# Patient Record
Sex: FEMALE | Race: WHITE | NOT HISPANIC OR LATINO
[De-identification: names, ages, dates, MRNs, and addresses within clinical notes are randomized per-mention and may not be internally consistent; named-entity substitution may affect disease eponyms.]

---

## 2017-01-02 ENCOUNTER — FORM ENCOUNTER (OUTPATIENT)
Age: 52
End: 2017-01-02

## 2017-01-03 ENCOUNTER — OUTPATIENT (OUTPATIENT)
Dept: OUTPATIENT SERVICES | Facility: HOSPITAL | Age: 52
LOS: 1 days | End: 2017-01-03
Payer: COMMERCIAL

## 2017-01-03 DIAGNOSIS — Z01.818 ENCOUNTER FOR OTHER PREPROCEDURAL EXAMINATION: ICD-10-CM

## 2017-01-03 DIAGNOSIS — C43.62 MALIGNANT MELANOMA OF LEFT UPPER LIMB, INCLUDING SHOULDER: ICD-10-CM

## 2017-01-03 LAB
ANION GAP SERPL CALC-SCNC: 18 MMOL/L — HIGH (ref 5–17)
APTT BLD: 31.8 SEC — SIGNIFICANT CHANGE UP (ref 27.5–37.4)
BUN SERPL-MCNC: 14 MG/DL — SIGNIFICANT CHANGE UP (ref 7–23)
CALCIUM SERPL-MCNC: 9.8 MG/DL — SIGNIFICANT CHANGE UP (ref 8.4–10.5)
CHLORIDE SERPL-SCNC: 101 MMOL/L — SIGNIFICANT CHANGE UP (ref 96–108)
CO2 SERPL-SCNC: 25 MMOL/L — SIGNIFICANT CHANGE UP (ref 22–31)
CREAT SERPL-MCNC: 0.74 MG/DL — SIGNIFICANT CHANGE UP (ref 0.5–1.3)
GLUCOSE SERPL-MCNC: 89 MG/DL — SIGNIFICANT CHANGE UP (ref 70–99)
HCT VFR BLD CALC: 38.9 % — SIGNIFICANT CHANGE UP (ref 34.5–45)
HGB BLD-MCNC: 12.8 G/DL — SIGNIFICANT CHANGE UP (ref 11.5–15.5)
INR BLD: 2.27 RATIO — HIGH (ref 0.88–1.16)
LDH SERPL L TO P-CCNC: 221 U/L — SIGNIFICANT CHANGE UP (ref 50–242)
MCHC RBC-ENTMCNC: 32.4 PG — SIGNIFICANT CHANGE UP (ref 27–34)
MCHC RBC-ENTMCNC: 32.9 GM/DL — SIGNIFICANT CHANGE UP (ref 32–36)
MCV RBC AUTO: 98.5 FL — SIGNIFICANT CHANGE UP (ref 80–100)
PLATELET # BLD AUTO: 220 K/UL — SIGNIFICANT CHANGE UP (ref 150–400)
POTASSIUM SERPL-MCNC: 4.1 MMOL/L — SIGNIFICANT CHANGE UP (ref 3.5–5.3)
POTASSIUM SERPL-SCNC: 4.1 MMOL/L — SIGNIFICANT CHANGE UP (ref 3.5–5.3)
PROTHROM AB SERPL-ACNC: 25.9 SEC — HIGH (ref 10–13.1)
RBC # BLD: 3.95 M/UL — SIGNIFICANT CHANGE UP (ref 3.8–5.2)
RBC # FLD: 12.3 % — SIGNIFICANT CHANGE UP (ref 10.3–14.5)
SODIUM SERPL-SCNC: 144 MMOL/L — SIGNIFICANT CHANGE UP (ref 135–145)
WBC # BLD: 4.58 K/UL — SIGNIFICANT CHANGE UP (ref 3.8–10.5)
WBC # FLD AUTO: 4.58 K/UL — SIGNIFICANT CHANGE UP (ref 3.8–10.5)

## 2017-01-03 PROCEDURE — 83615 LACTATE (LD) (LDH) ENZYME: CPT

## 2017-01-03 PROCEDURE — 85610 PROTHROMBIN TIME: CPT

## 2017-01-03 PROCEDURE — 71046 X-RAY EXAM CHEST 2 VIEWS: CPT

## 2017-01-03 PROCEDURE — 80048 BASIC METABOLIC PNL TOTAL CA: CPT

## 2017-01-03 PROCEDURE — 36415 COLL VENOUS BLD VENIPUNCTURE: CPT

## 2017-01-03 PROCEDURE — 85730 THROMBOPLASTIN TIME PARTIAL: CPT

## 2017-01-03 PROCEDURE — G0463: CPT

## 2017-01-03 PROCEDURE — 71020: CPT | Mod: 26

## 2017-01-03 PROCEDURE — 85027 COMPLETE CBC AUTOMATED: CPT

## 2017-01-04 ENCOUNTER — APPOINTMENT (OUTPATIENT)
Dept: PLASTIC SURGERY | Facility: CLINIC | Age: 52
End: 2017-01-04

## 2017-01-17 ENCOUNTER — APPOINTMENT (OUTPATIENT)
Dept: SURGICAL ONCOLOGY | Facility: HOSPITAL | Age: 52
End: 2017-01-17

## 2017-01-20 ENCOUNTER — APPOINTMENT (OUTPATIENT)
Dept: MRI IMAGING | Facility: CLINIC | Age: 52
End: 2017-01-20

## 2017-01-20 ENCOUNTER — OUTPATIENT (OUTPATIENT)
Dept: OUTPATIENT SERVICES | Facility: HOSPITAL | Age: 52
LOS: 1 days | End: 2017-01-20
Payer: COMMERCIAL

## 2017-01-20 DIAGNOSIS — K76.9 LIVER DISEASE, UNSPECIFIED: ICD-10-CM

## 2017-01-20 PROCEDURE — 74183 MRI ABD W/O CNTR FLWD CNTR: CPT

## 2017-01-20 PROCEDURE — A9585: CPT

## 2017-01-24 ENCOUNTER — RESULT REVIEW (OUTPATIENT)
Age: 52
End: 2017-01-24

## 2017-02-14 ENCOUNTER — OUTPATIENT (OUTPATIENT)
Dept: OUTPATIENT SERVICES | Facility: HOSPITAL | Age: 52
LOS: 1 days | End: 2017-02-14

## 2017-02-14 VITALS
SYSTOLIC BLOOD PRESSURE: 136 MMHG | HEART RATE: 70 BPM | TEMPERATURE: 98 F | HEIGHT: 65 IN | DIASTOLIC BLOOD PRESSURE: 75 MMHG | RESPIRATION RATE: 16 BRPM | OXYGEN SATURATION: 98 % | WEIGHT: 134.92 LBS

## 2017-02-14 DIAGNOSIS — D68.2 HEREDITARY DEFICIENCY OF OTHER CLOTTING FACTORS: ICD-10-CM

## 2017-02-14 DIAGNOSIS — C43.9 MALIGNANT MELANOMA OF SKIN, UNSPECIFIED: ICD-10-CM

## 2017-02-14 DIAGNOSIS — Z90.49 ACQUIRED ABSENCE OF OTHER SPECIFIED PARTS OF DIGESTIVE TRACT: Chronic | ICD-10-CM

## 2017-02-14 DIAGNOSIS — C43.62 MALIGNANT MELANOMA OF LEFT UPPER LIMB, INCLUDING SHOULDER: ICD-10-CM

## 2017-02-14 DIAGNOSIS — Z01.818 ENCOUNTER FOR OTHER PREPROCEDURAL EXAMINATION: ICD-10-CM

## 2017-02-14 RX ORDER — SODIUM CHLORIDE 9 MG/ML
3 INJECTION INTRAMUSCULAR; INTRAVENOUS; SUBCUTANEOUS EVERY 8 HOURS
Qty: 0 | Refills: 0 | Status: DISCONTINUED | OUTPATIENT
Start: 2017-02-16 | End: 2017-03-03

## 2017-02-14 NOTE — H&P PST ADULT - NEUROLOGICAL DETAILS
responds to verbal commands/responds to pain/sensation intact/cranial nerves intact/alert and oriented x 3

## 2017-02-14 NOTE — H&P PST ADULT - NSANTHOSAYNRD_GEN_A_CORE
No. [place] screening performed.  STOP BANG Legend: 0-2 = LOW Risk; 3-4 = INTERMEDIATE Risk; 5-8 = HIGH Risk

## 2017-02-14 NOTE — H&P PST ADULT - MUSCULOSKELETAL
negative detailed exam no joint erythema/ROM intact/no joint swelling/normal strength/normal/no calf tenderness

## 2017-02-14 NOTE — H&P PST ADULT - PMH
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

## 2017-02-14 NOTE — H&P PST ADULT - ATTENDING COMMENTS
51 year old lady with a 0.55 mm (T1a) melanoma of the posterior, upper right arm, scheduled for w.e. with plastics (Dr Soriano).  Due to a Factor VII deficiency, she is having the operation in the main O.R. to monitor for intra- and post-op bleeding, and possible transfusion.  D/W Pt and , all questions answered.  Consent on chart

## 2017-02-14 NOTE — H&P PST ADULT - PROBLEM SELECTOR PLAN 1
Wide Excision of Melanoma left arm  -pre-op instructions given Wide Excision of Melanoma left arm  -pre-op instructions given  urine pregnancy test ordered

## 2017-02-14 NOTE — H&P PST ADULT - MALLAMPATI CLASS
Class II - visualization of the soft palate, fauces, and uvula Class II - visualization of the soft palate, fauces, and uvula/13.5 inch neck

## 2017-02-15 ENCOUNTER — RESULT REVIEW (OUTPATIENT)
Age: 52
End: 2017-02-15

## 2017-02-16 ENCOUNTER — TRANSCRIPTION ENCOUNTER (OUTPATIENT)
Age: 52
End: 2017-02-16

## 2017-02-16 ENCOUNTER — OUTPATIENT (OUTPATIENT)
Dept: OUTPATIENT SERVICES | Facility: HOSPITAL | Age: 52
LOS: 1 days | Discharge: HOME | End: 2017-02-16
Payer: COMMERCIAL

## 2017-02-16 ENCOUNTER — APPOINTMENT (OUTPATIENT)
Dept: SURGICAL ONCOLOGY | Facility: HOSPITAL | Age: 52
End: 2017-02-16

## 2017-02-16 VITALS — OXYGEN SATURATION: 97 % | RESPIRATION RATE: 18 BRPM | HEART RATE: 79 BPM

## 2017-02-16 VITALS
OXYGEN SATURATION: 99 % | TEMPERATURE: 98 F | WEIGHT: 134.92 LBS | DIASTOLIC BLOOD PRESSURE: 67 MMHG | HEART RATE: 73 BPM | RESPIRATION RATE: 18 BRPM | SYSTOLIC BLOOD PRESSURE: 98 MMHG

## 2017-02-16 DIAGNOSIS — C43.62 MALIGNANT MELANOMA OF LEFT UPPER LIMB, INCLUDING SHOULDER: ICD-10-CM

## 2017-02-16 DIAGNOSIS — Z90.49 ACQUIRED ABSENCE OF OTHER SPECIFIED PARTS OF DIGESTIVE TRACT: Chronic | ICD-10-CM

## 2017-02-16 LAB — HCG UR QL: NEGATIVE — SIGNIFICANT CHANGE UP

## 2017-02-16 PROCEDURE — 11604 EXC TR-EXT MAL+MARG 3.1-4 CM: CPT

## 2017-02-16 PROCEDURE — 88305 TISSUE EXAM BY PATHOLOGIST: CPT

## 2017-02-16 PROCEDURE — 14301 TIS TRNFR ANY 30.1-60 SQ CM: CPT

## 2017-02-16 PROCEDURE — 88305 TISSUE EXAM BY PATHOLOGIST: CPT | Mod: 26

## 2017-02-16 PROCEDURE — 14020 TIS TRNFR S/A/L 10 SQ CM/<: CPT

## 2017-02-16 PROCEDURE — 81025 URINE PREGNANCY TEST: CPT

## 2017-02-16 RX ORDER — SODIUM CHLORIDE 9 MG/ML
1000 INJECTION, SOLUTION INTRAVENOUS
Qty: 0 | Refills: 0 | Status: DISCONTINUED | OUTPATIENT
Start: 2017-02-16 | End: 2017-03-03

## 2017-02-16 RX ORDER — ONDANSETRON 8 MG/1
4 TABLET, FILM COATED ORAL ONCE
Qty: 0 | Refills: 0 | Status: DISCONTINUED | OUTPATIENT
Start: 2017-02-16 | End: 2017-02-16

## 2017-02-16 RX ORDER — CEFAZOLIN SODIUM 1 G
2000 VIAL (EA) INJECTION ONCE
Qty: 0 | Refills: 0 | Status: COMPLETED | OUTPATIENT
Start: 2017-02-16 | End: 2017-02-16

## 2017-02-16 RX ADMIN — SODIUM CHLORIDE 75 MILLILITER(S): 9 INJECTION, SOLUTION INTRAVENOUS at 11:47

## 2017-02-16 RX ADMIN — Medication 100 MILLIGRAM(S): at 08:00

## 2017-02-16 RX ADMIN — SODIUM CHLORIDE 3 MILLILITER(S): 9 INJECTION INTRAMUSCULAR; INTRAVENOUS; SUBCUTANEOUS at 14:15

## 2017-02-16 NOTE — ASU DISCHARGE PLAN (ADULT/PEDIATRIC). - ITEMS TO FOLLOWUP WITH YOUR PHYSICIAN'S
Please follow up with Dr. Albarran within 1-2 weeks after discharge from the hospital. You may call [phone] to schedule an appointment.    Please follow up with Dr. Soriano within x1 week after discharge from the hospital. You may call [phone] to schedule an appointment.

## 2017-02-16 NOTE — ASU DISCHARGE PLAN (ADULT/PEDIATRIC). - SPECIAL INSTRUCTIONS
Keep dressings clean, dry, and intact. Do not remove them until you're seen by Dr. Soriano    Do not remove steri strips. They will fall off on their own.  After showering, please pat steri strips dry. After surgery, some blood may escape from under the tape. It is of no consequence. The paper tape may fall off after several days. If not, I will remove it in my office.    Please follow up with your primary care physician regarding your hospitalization. Please schedule an appointment with your primary care provider within two weeks after your discharge to review your hospital course.     Call 911 and return to the ED for chest pain, shortness of breath, significant increase in pain, or significant change in color of surgical sites.

## 2017-02-16 NOTE — ASU DISCHARGE PLAN (ADULT/PEDIATRIC). - CONDITIONS AT DISCHARGE
patient oob-chair, ambulates all without difficulty tolerates clears to regular diet with no gi distress

## 2017-02-16 NOTE — ASU DISCHARGE PLAN (ADULT/PEDIATRIC). - MEDICATION SUMMARY - MEDICATIONS TO TAKE
I will START or STAY ON the medications listed below when I get home from the hospital:    Percocet 5/325 325 mg-5 mg oral tablet  -- 1 tab(s) by mouth every 6 hours, As Needed -for severe pain MDD:8  -- Caution federal law prohibits the transfer of this drug to any person other  than the person for whom it was prescribed.  May cause drowsiness.  Alcohol may intensify this effect.  Use care when operating dangerous machinery.  This prescription cannot be refilled.  This product contains acetaminophen.  Do not use  with any other product containing acetaminophen to prevent possible liver damage.  Using more of this medication than prescribed may cause serious breathing problems.    -- Indication: For Pain

## 2017-02-16 NOTE — ASU DISCHARGE PLAN (ADULT/PEDIATRIC). - NOTIFY
Fever greater than 101/Numbness, color, or temperature change to extremity/Bleeding that does not stop/Pain not relieved by Medications/Swelling that continues/Numbness, tingling/Persistent Nausea and Vomiting

## 2017-02-22 ENCOUNTER — TRANSCRIPTION ENCOUNTER (OUTPATIENT)
Age: 52
End: 2017-02-22

## 2017-02-22 ENCOUNTER — APPOINTMENT (OUTPATIENT)
Dept: PLASTIC SURGERY | Facility: CLINIC | Age: 52
End: 2017-02-22

## 2017-02-23 LAB — SURGICAL PATHOLOGY STUDY: SIGNIFICANT CHANGE UP

## 2017-02-25 ENCOUNTER — APPOINTMENT (OUTPATIENT)
Dept: MAMMOGRAPHY | Facility: IMAGING CENTER | Age: 52
End: 2017-02-25

## 2017-03-01 ENCOUNTER — APPOINTMENT (OUTPATIENT)
Dept: PLASTIC SURGERY | Facility: CLINIC | Age: 52
End: 2017-03-01

## 2017-03-22 ENCOUNTER — APPOINTMENT (OUTPATIENT)
Dept: PLASTIC SURGERY | Facility: CLINIC | Age: 52
End: 2017-03-22

## 2017-03-25 RX ORDER — OXYCODONE AND ACETAMINOPHEN 5; 325 MG/1; MG/1
5-325 TABLET ORAL
Qty: 30 | Refills: 0 | Status: ACTIVE | COMMUNITY
Start: 2017-02-16

## 2017-04-19 ENCOUNTER — APPOINTMENT (OUTPATIENT)
Dept: PLASTIC SURGERY | Facility: CLINIC | Age: 52
End: 2017-04-19

## 2017-05-02 ENCOUNTER — APPOINTMENT (OUTPATIENT)
Dept: ULTRASOUND IMAGING | Facility: IMAGING CENTER | Age: 52
End: 2017-05-02

## 2017-05-02 ENCOUNTER — APPOINTMENT (OUTPATIENT)
Dept: MAMMOGRAPHY | Facility: IMAGING CENTER | Age: 52
End: 2017-05-02

## 2017-05-24 ENCOUNTER — APPOINTMENT (OUTPATIENT)
Dept: PLASTIC SURGERY | Facility: CLINIC | Age: 52
End: 2017-05-24

## 2018-05-10 ENCOUNTER — APPOINTMENT (OUTPATIENT)
Dept: SURGICAL ONCOLOGY | Facility: CLINIC | Age: 53
End: 2018-05-10
Payer: COMMERCIAL

## 2018-05-10 VITALS
HEART RATE: 71 BPM | SYSTOLIC BLOOD PRESSURE: 106 MMHG | WEIGHT: 138.89 LBS | BODY MASS INDEX: 23.14 KG/M2 | DIASTOLIC BLOOD PRESSURE: 71 MMHG | RESPIRATION RATE: 15 BRPM | HEIGHT: 65 IN

## 2018-05-10 DIAGNOSIS — R92.8 OTHER ABNORMAL AND INCONCLUSIVE FINDINGS ON DIAGNOSTIC IMAGING OF BREAST: ICD-10-CM

## 2018-05-10 PROCEDURE — 99213 OFFICE O/P EST LOW 20 MIN: CPT

## 2018-07-04 ENCOUNTER — FORM ENCOUNTER (OUTPATIENT)
Age: 53
End: 2018-07-04

## 2018-07-05 ENCOUNTER — APPOINTMENT (OUTPATIENT)
Dept: RADIOLOGY | Facility: IMAGING CENTER | Age: 53
End: 2018-07-05
Payer: COMMERCIAL

## 2018-07-05 ENCOUNTER — APPOINTMENT (OUTPATIENT)
Dept: MAMMOGRAPHY | Facility: IMAGING CENTER | Age: 53
End: 2018-07-05
Payer: COMMERCIAL

## 2018-07-05 ENCOUNTER — OUTPATIENT (OUTPATIENT)
Dept: OUTPATIENT SERVICES | Facility: HOSPITAL | Age: 53
LOS: 1 days | End: 2018-07-05
Payer: COMMERCIAL

## 2018-07-05 ENCOUNTER — APPOINTMENT (OUTPATIENT)
Dept: ULTRASOUND IMAGING | Facility: IMAGING CENTER | Age: 53
End: 2018-07-05
Payer: COMMERCIAL

## 2018-07-05 DIAGNOSIS — Z90.49 ACQUIRED ABSENCE OF OTHER SPECIFIED PARTS OF DIGESTIVE TRACT: Chronic | ICD-10-CM

## 2018-07-05 DIAGNOSIS — Z00.8 ENCOUNTER FOR OTHER GENERAL EXAMINATION: ICD-10-CM

## 2018-07-05 PROCEDURE — 77067 SCR MAMMO BI INCL CAD: CPT

## 2018-07-05 PROCEDURE — 71046 X-RAY EXAM CHEST 2 VIEWS: CPT | Mod: 26

## 2018-07-05 PROCEDURE — 76641 ULTRASOUND BREAST COMPLETE: CPT | Mod: 26,50

## 2018-07-05 PROCEDURE — 77067 SCR MAMMO BI INCL CAD: CPT | Mod: 26

## 2018-07-05 PROCEDURE — 71046 X-RAY EXAM CHEST 2 VIEWS: CPT

## 2018-07-05 PROCEDURE — 76641 ULTRASOUND BREAST COMPLETE: CPT

## 2018-07-05 PROCEDURE — 77063 BREAST TOMOSYNTHESIS BI: CPT | Mod: 26

## 2018-07-05 PROCEDURE — 77063 BREAST TOMOSYNTHESIS BI: CPT

## 2018-07-09 ENCOUNTER — FORM ENCOUNTER (OUTPATIENT)
Age: 53
End: 2018-07-09

## 2018-07-09 PROBLEM — R92.8 ABNORMAL FINDING ON BREAST IMAGING: Status: ACTIVE | Noted: 2018-07-09

## 2018-07-10 ENCOUNTER — OUTPATIENT (OUTPATIENT)
Dept: OUTPATIENT SERVICES | Facility: HOSPITAL | Age: 53
LOS: 1 days | End: 2018-07-10
Payer: COMMERCIAL

## 2018-07-10 ENCOUNTER — APPOINTMENT (OUTPATIENT)
Dept: ULTRASOUND IMAGING | Facility: IMAGING CENTER | Age: 53
End: 2018-07-10
Payer: COMMERCIAL

## 2018-07-10 DIAGNOSIS — R92.8 OTHER ABNORMAL AND INCONCLUSIVE FINDINGS ON DIAGNOSTIC IMAGING OF BREAST: ICD-10-CM

## 2018-07-10 DIAGNOSIS — Z90.49 ACQUIRED ABSENCE OF OTHER SPECIFIED PARTS OF DIGESTIVE TRACT: Chronic | ICD-10-CM

## 2018-07-10 PROCEDURE — 76642 ULTRASOUND BREAST LIMITED: CPT | Mod: 26,RT

## 2018-07-10 PROCEDURE — 76642 ULTRASOUND BREAST LIMITED: CPT

## 2018-07-16 PROBLEM — R10.9 UNSPECIFIED ABDOMINAL PAIN: Chronic | Status: ACTIVE | Noted: 2017-02-14

## 2018-07-16 PROBLEM — D68.2 HEREDITARY DEFICIENCY OF OTHER CLOTTING FACTORS: Chronic | Status: ACTIVE | Noted: 2017-02-14

## 2019-02-07 ENCOUNTER — APPOINTMENT (OUTPATIENT)
Dept: SURGICAL ONCOLOGY | Facility: CLINIC | Age: 54
End: 2019-02-07

## 2019-02-07 NOTE — PHYSICAL EXAM
[Normal] : supple, no neck mass and thyroid not enlarged [Normal Neck Lymph Nodes] : normal neck lymph nodes  [Normal Supraclavicular Lymph Nodes] : normal supraclavicular lymph nodes [Normal Axillary Lymph Nodes] : normal axillary lymph nodes [Normal] : full range of motion and no deformities appreciated [de-identified] : Groins not examined [de-identified] : Below

## 2019-02-07 NOTE — ASSESSMENT
[FreeTextEntry1] : Yearly chest x-ray is with her annual breast imaging, in July; prescription entered for 2019.\par \par NO SHOW FOR FEB 07, 2019, OV

## 2019-02-07 NOTE — HISTORY OF PRESENT ILLNESS
[de-identified] : 53 year-old lady who February 2017 had wide excision of an uncomplicated 0.55 mm melanoma from the posterior LEFT ARM, with negative margins, and reconstruction by Dr. Soriano.\par \par \par Her dermatologist NOW Dr. Kailee Grimaldo.\par Biopsies in August 2018 unremarkable.\par \par \par December 2016 she was referred by her dermatologist Dr. Khushboo Colmenares with the recent diagnosis of a 0.55 mm uncomplicated melanoma of the posterior aspect of the middle third of her left arm.\par This was an asymptomatic pigmented lesion which she first noticed in the summer of 2016. \par Its gradual growth to the above dermatologic evaluation, biopsy, and diagnosis.\par \par No previous personal or family history of melanoma.\par A brother has had non-melanoma skin cancers.\par \par Her father had myeloma.\par She has a maternal aunt AND a paternal aunt with breast cancer.\par \par Her internist is Dr. Clyde Crisostomo.\par \par She does not have a pacemaker or defibrillator.\par She takes no anticoagulants.\par Her only prescription medication is Dostinex\par \par She has a factor VII deficiency, but no abnormalities with clotting or bleeding.\par Her hematologic evaluation was in [place] where she used to reside until 2012.\par She also has relatives with the factor VII deficiency.\par Her hematologist Dr. Mojica, and Tyrese Costa\par \par 07-09-18.\par Annual bilateral mammograms and ultrasounds July 2018 450 are BIRADS-0:\par 07-10-18.\par Supplemental right breast ultrasound: nodule is an intramammary lymph node which has been stable since 2015.\par Annual followup mammography and breast ultrasound will be July 2019.; Prescription entered today\par \par She saw her gynecologist Dr. Stevenson in 2017, and had no abnormalities.\par \par Her eye examination the summer of 2016 was normal.\par I suggested a followup visit\par \par She has never had colonoscopy

## 2019-02-07 NOTE — REASON FOR VISIT
[Follow-Up Visit] : a follow-up visit for [Other: _____] : [unfilled] [FreeTextEntry2] : NO SHOW FOR F/U OF: Left arm melanoma

## 2019-02-11 PROBLEM — C43.9 MALIGNANT MELANOMA OF SKIN, UNSPECIFIED: Chronic | Status: ACTIVE | Noted: 2017-02-14

## 2019-03-07 ENCOUNTER — APPOINTMENT (OUTPATIENT)
Dept: SURGICAL ONCOLOGY | Facility: CLINIC | Age: 54
End: 2019-03-07
Payer: COMMERCIAL

## 2019-03-07 ENCOUNTER — TRANSCRIPTION ENCOUNTER (OUTPATIENT)
Age: 54
End: 2019-03-07

## 2019-03-07 VITALS
SYSTOLIC BLOOD PRESSURE: 119 MMHG | HEIGHT: 65 IN | RESPIRATION RATE: 15 BRPM | BODY MASS INDEX: 22.49 KG/M2 | WEIGHT: 135 LBS | DIASTOLIC BLOOD PRESSURE: 75 MMHG | HEART RATE: 72 BPM

## 2019-03-07 PROCEDURE — 99214 OFFICE O/P EST MOD 30 MIN: CPT

## 2019-06-03 ENCOUNTER — EMERGENCY (EMERGENCY)
Facility: HOSPITAL | Age: 54
LOS: 1 days | Discharge: HOME | End: 2019-06-03
Attending: EMERGENCY MEDICINE
Payer: COMMERCIAL

## 2019-06-03 VITALS
RESPIRATION RATE: 18 BRPM | HEART RATE: 86 BPM | WEIGHT: 143.96 LBS | TEMPERATURE: 98 F | SYSTOLIC BLOOD PRESSURE: 134 MMHG | HEIGHT: 65 IN | DIASTOLIC BLOOD PRESSURE: 86 MMHG | OXYGEN SATURATION: 98 %

## 2019-06-03 VITALS
DIASTOLIC BLOOD PRESSURE: 72 MMHG | HEART RATE: 78 BPM | RESPIRATION RATE: 18 BRPM | SYSTOLIC BLOOD PRESSURE: 116 MMHG | OXYGEN SATURATION: 97 %

## 2019-06-03 DIAGNOSIS — Z90.49 ACQUIRED ABSENCE OF OTHER SPECIFIED PARTS OF DIGESTIVE TRACT: Chronic | ICD-10-CM

## 2019-06-03 LAB
ALBUMIN SERPL ELPH-MCNC: 4 G/DL — SIGNIFICANT CHANGE UP (ref 3.3–5)
ALP SERPL-CCNC: 57 U/L — SIGNIFICANT CHANGE UP (ref 40–120)
ALT FLD-CCNC: 20 U/L — SIGNIFICANT CHANGE UP (ref 10–45)
ANION GAP SERPL CALC-SCNC: 17 MMOL/L — SIGNIFICANT CHANGE UP (ref 5–17)
AST SERPL-CCNC: 31 U/L — SIGNIFICANT CHANGE UP (ref 10–40)
BASOPHILS # BLD AUTO: 0 K/UL — SIGNIFICANT CHANGE UP (ref 0–0.2)
BASOPHILS NFR BLD AUTO: 0.4 % — SIGNIFICANT CHANGE UP (ref 0–2)
BILIRUB SERPL-MCNC: 2.1 MG/DL — HIGH (ref 0.2–1.2)
BUN SERPL-MCNC: 6 MG/DL — LOW (ref 7–23)
CALCIUM SERPL-MCNC: 9 MG/DL — SIGNIFICANT CHANGE UP (ref 8.4–10.5)
CHLORIDE SERPL-SCNC: 93 MMOL/L — LOW (ref 96–108)
CO2 SERPL-SCNC: 20 MMOL/L — LOW (ref 22–31)
CREAT SERPL-MCNC: 0.52 MG/DL — SIGNIFICANT CHANGE UP (ref 0.5–1.3)
EOSINOPHIL # BLD AUTO: 0.1 K/UL — SIGNIFICANT CHANGE UP (ref 0–0.5)
EOSINOPHIL NFR BLD AUTO: 1.6 % — SIGNIFICANT CHANGE UP (ref 0–6)
GLUCOSE SERPL-MCNC: 102 MG/DL — HIGH (ref 70–99)
HCT VFR BLD CALC: 33.8 % — LOW (ref 34.5–45)
HGB BLD-MCNC: 11.5 G/DL — SIGNIFICANT CHANGE UP (ref 11.5–15.5)
LYMPHOCYTES # BLD AUTO: 1.9 K/UL — SIGNIFICANT CHANGE UP (ref 1–3.3)
LYMPHOCYTES # BLD AUTO: 37.6 % — SIGNIFICANT CHANGE UP (ref 13–44)
MAGNESIUM SERPL-MCNC: 1.6 MG/DL — SIGNIFICANT CHANGE UP (ref 1.6–2.6)
MCHC RBC-ENTMCNC: 33 PG — SIGNIFICANT CHANGE UP (ref 27–34)
MCHC RBC-ENTMCNC: 34.2 GM/DL — SIGNIFICANT CHANGE UP (ref 32–36)
MCV RBC AUTO: 96.7 FL — SIGNIFICANT CHANGE UP (ref 80–100)
MONOCYTES # BLD AUTO: 0.1 K/UL — SIGNIFICANT CHANGE UP (ref 0–0.9)
MONOCYTES NFR BLD AUTO: 2.9 % — SIGNIFICANT CHANGE UP (ref 2–14)
NEUTROPHILS # BLD AUTO: 2.9 K/UL — SIGNIFICANT CHANGE UP (ref 1.8–7.4)
NEUTROPHILS NFR BLD AUTO: 57.6 % — SIGNIFICANT CHANGE UP (ref 43–77)
PHOSPHATE SERPL-MCNC: 3 MG/DL — SIGNIFICANT CHANGE UP (ref 2.5–4.5)
PLATELET # BLD AUTO: 167 K/UL — SIGNIFICANT CHANGE UP (ref 150–400)
POTASSIUM SERPL-MCNC: 4 MMOL/L — SIGNIFICANT CHANGE UP (ref 3.5–5.3)
POTASSIUM SERPL-SCNC: 4 MMOL/L — SIGNIFICANT CHANGE UP (ref 3.5–5.3)
PROT SERPL-MCNC: 6.9 G/DL — SIGNIFICANT CHANGE UP (ref 6–8.3)
RBC # BLD: 3.49 M/UL — LOW (ref 3.8–5.2)
RBC # FLD: 11.2 % — SIGNIFICANT CHANGE UP (ref 10.3–14.5)
SODIUM SERPL-SCNC: 130 MMOL/L — LOW (ref 135–145)
WBC # BLD: 5.1 K/UL — SIGNIFICANT CHANGE UP (ref 3.8–10.5)
WBC # FLD AUTO: 5.1 K/UL — SIGNIFICANT CHANGE UP (ref 3.8–10.5)

## 2019-06-03 PROCEDURE — 99284 EMERGENCY DEPT VISIT MOD MDM: CPT | Mod: 25

## 2019-06-03 PROCEDURE — 83735 ASSAY OF MAGNESIUM: CPT

## 2019-06-03 PROCEDURE — 84100 ASSAY OF PHOSPHORUS: CPT

## 2019-06-03 PROCEDURE — 85027 COMPLETE CBC AUTOMATED: CPT

## 2019-06-03 PROCEDURE — 96374 THER/PROPH/DIAG INJ IV PUSH: CPT

## 2019-06-03 PROCEDURE — 80053 COMPREHEN METABOLIC PANEL: CPT

## 2019-06-03 RX ORDER — METOCLOPRAMIDE HCL 10 MG
10 TABLET ORAL ONCE
Refills: 0 | Status: COMPLETED | OUTPATIENT
Start: 2019-06-03 | End: 2019-06-03

## 2019-06-03 RX ORDER — SODIUM CHLORIDE 9 MG/ML
1000 INJECTION INTRAMUSCULAR; INTRAVENOUS; SUBCUTANEOUS ONCE
Refills: 0 | Status: COMPLETED | OUTPATIENT
Start: 2019-06-03 | End: 2019-06-03

## 2019-06-03 RX ADMIN — SODIUM CHLORIDE 2000 MILLILITER(S): 9 INJECTION INTRAMUSCULAR; INTRAVENOUS; SUBCUTANEOUS at 01:49

## 2019-06-03 RX ADMIN — Medication 10 MILLIGRAM(S): at 01:49

## 2019-06-03 NOTE — ED ADULT NURSE NOTE - NSIMPLEMENTINTERV_GEN_ALL_ED
Implemented All Universal Safety Interventions:  [place]jamin to call system. Call bell, personal items and telephone within reach. Instruct patient to call for assistance. Room bathroom lighting operational. Non-slip footwear when patient is off stretcher. Physically safe environment: no spills, clutter or unnecessary equipment. Stretcher in lowest position, wheels locked, appropriate side rails in place.

## 2019-06-03 NOTE — ED PROVIDER NOTE - ATTENDING CONTRIBUTION TO CARE
53 yof pmhx melanoma, is currently doing a bowel prep for a colonoscopy later this AM, presents for weakness/ dizziness/ lightheadedness and one episode of vomiting pta. states diffuse mild headache. reba has not had anything PO besides few sips of water x 24 hrs and has been having mult episodes watery stool due to drinking the bowel prep    ROS:   constitutional - no fever, no chills  eyes - no visual changes, no redness  eent - no sore throat, no nasal congestion  cvs - no chest pain, no leg swelling  resp - no shortness of breath, no cough  gi - + abdominal pain, + vomiting, + diarrhea  gu - no dysuria, no hematuria  msk - no acute back pain, no joint swelling  skin - no rashes, no jaundice  neuro - no headache, no focal weakness  psych - no acute mental health issue     Physical Exam:   constitutional - well appearing, awake and alert, oriented x3  head - no external evidence of trauma  eent - no conjunctival injection or icterus, mucous membranes moist   cvs - rrr, no murmurs, no peripheral edema  resp - breath sounds clear and equal bilat, normal respiratory effort  gi - abdomen soft and nontender, no rigidity, guarding or rebound, bowel sounds present  msk - moving all extremities spontaneously, gait is at baseline for patient  neuro - alert and oriented x3, no focal deficits, CNs 2-12 grossly intact  skin- no jaundice, warm and dry  psych - mood and affect wnl, no apparent risk to self or others     likely dehydration from mult bowel mvmts from bowel prep. pt  has been told by pmd that tbili is always high. feelng much better after fluids in ED - pt wants to be complaint w bowel prep to have the colonoscopy,  was told she could have juice so given po juice and dominga in ED. additional verbal instructions regarding diagnosis, return precautions and follow up plan given to pt and/or family. KENROY Hays MD

## 2019-06-03 NOTE — ED PROVIDER NOTE - PHYSICAL EXAMINATION
General: well-appearing woman in no acute distress  Head: normocephalic, atraumatic  Eyes: PERRL   Mouth: moist mucous membranes  Neck: supple neck  CV: normal rate and rhythm  Respiratory: clear to auscultation bilaterally  Abdomen: soft, nontender, nondistended  Back: no midline tenderness to palpation  Neuro: alert and oriented x3, speech clear, moving all extremities spontaneously  Extremities: no LE edema, peripheral pulses 2+ bilaterally

## 2019-06-03 NOTE — ED PROVIDER NOTE - OBJECTIVE STATEMENT
54yo woman PMH melanoma presents with headache and shaking after taking bowel prep for colonoscopy tomorrow. Pt has been NPO today before taking prep and had an episode of nbnb vomiting 30 minutes after taking prep in addition to bowel movements. She reports a posterior headache with no associated fever, neck stiffness, focal weakness. Denies chest pain, SOB, abdominal pain, dysuria. 54yo woman PMH melanoma presents with headache and shaking after taking bowel prep for colonoscopy tomorrow. Pt has been NPO today before taking prep and had an episode of nbnb vomiting 30 minutes after taking prep in addition to bowel movements. She reports a posterior headache with no associated fever, neck stiffness, focal weakness. Denies chest pain, SOB, abdominal pain, dysuria. She was getting colonoscopy for routine evaluation.

## 2019-06-03 NOTE — ED PROVIDER NOTE - NS ED ROS FT
General: no fever  Head: +headache  Eyes: no vision change  ENT: +neck pain, no neck stiffness  CV: no chest pain  Resp: no SOB  GI: +n/v/d, no abdominal pain  : no dysuria  MSK: no joint pain  Skin: no new rash  Neuro: no focal weakness, no change in sensation

## 2019-06-03 NOTE — ED PROVIDER NOTE - NSFOLLOWUPINSTRUCTIONS_ED_ALL_ED_FT
Your symptoms are likely due to dehydration in the setting of prepping for your colonoscopy.     Please continue to drink fluids and juice as directed.     Return to the ER for any worsening pain, vomiting, or any other concerns.

## 2019-06-03 NOTE — ED PROVIDER NOTE - PMH
Abdominal pain  F/U with PMDno intervention  recemended colon cancer screening  Factor VII deficiency  pt denies any  bleeding issues in the past  Malignant melanoma

## 2019-06-03 NOTE — ED PROVIDER NOTE - CLINICAL SUMMARY MEDICAL DECISION MAKING FREE TEXT BOX
54yo woman presenting with headache and shaking after taking bowel prep for colonoscopy in setting of being NPO. Symptoms are likely 2/2 to volume loss and will need to drink more clear liquids/juice until colonoscopy. Will check labs for possible dehydration and electrolyte imbalances and give reglan for headache and reassess. Likely discharge with outpatient f/u.

## 2019-06-03 NOTE — ED ADULT NURSE NOTE - OBJECTIVE STATEMENT
54 yo f reporting to ED for headache. No PMH. pt took medication for colonoscopy tomorrow and had nausea, two episodes of vomiting, and several episodes of diarrhea. Pt now complaining of 6/10 generalized headache. A&Ox3. PERRL. No facial droop noted. No slurred speech. Respirations spontaneous, non-labored. Pt upper and lower extremities bilateral in strength. Peripheral pulses x4, skin warm & dry. Pt denies dizziness, blurred vision, weakness, numbness/tingling, chest pain, SOB, n/v, abdominal pain, fevers, & chills. 20 gauge established in the RAC.  at bedside. Bed locked & in lowest position. Safety measures maintained. Will continue to reassess.

## 2019-06-03 NOTE — ED PROVIDER NOTE - PROGRESS NOTE DETAILS
Reema Daniel, resident MD: pt reported improvement of symptoms. pt is aware of elevated bilirubin and has been told she has consistently elevated bilirubin at baseline.

## 2019-08-11 ENCOUNTER — FORM ENCOUNTER (OUTPATIENT)
Age: 54
End: 2019-08-11

## 2019-08-12 ENCOUNTER — APPOINTMENT (OUTPATIENT)
Dept: ULTRASOUND IMAGING | Facility: IMAGING CENTER | Age: 54
End: 2019-08-12
Payer: COMMERCIAL

## 2019-08-12 ENCOUNTER — APPOINTMENT (OUTPATIENT)
Dept: RADIOLOGY | Facility: IMAGING CENTER | Age: 54
End: 2019-08-12
Payer: COMMERCIAL

## 2019-08-12 ENCOUNTER — OUTPATIENT (OUTPATIENT)
Dept: OUTPATIENT SERVICES | Facility: HOSPITAL | Age: 54
LOS: 1 days | End: 2019-08-12
Payer: COMMERCIAL

## 2019-08-12 ENCOUNTER — APPOINTMENT (OUTPATIENT)
Dept: MAMMOGRAPHY | Facility: IMAGING CENTER | Age: 54
End: 2019-08-12
Payer: COMMERCIAL

## 2019-08-12 DIAGNOSIS — C43.62 MALIGNANT MELANOMA OF LEFT UPPER LIMB, INCLUDING SHOULDER: ICD-10-CM

## 2019-08-12 DIAGNOSIS — Z90.49 ACQUIRED ABSENCE OF OTHER SPECIFIED PARTS OF DIGESTIVE TRACT: Chronic | ICD-10-CM

## 2019-08-12 PROCEDURE — G0279: CPT | Mod: 26

## 2019-08-12 PROCEDURE — 77066 DX MAMMO INCL CAD BI: CPT | Mod: 26

## 2019-08-12 PROCEDURE — 71046 X-RAY EXAM CHEST 2 VIEWS: CPT

## 2019-08-12 PROCEDURE — 71046 X-RAY EXAM CHEST 2 VIEWS: CPT | Mod: 26

## 2019-08-12 PROCEDURE — 77067 SCR MAMMO BI INCL CAD: CPT

## 2019-08-12 PROCEDURE — 77066 DX MAMMO INCL CAD BI: CPT

## 2019-08-12 PROCEDURE — 76641 ULTRASOUND BREAST COMPLETE: CPT

## 2019-08-12 PROCEDURE — 77063 BREAST TOMOSYNTHESIS BI: CPT

## 2019-08-12 PROCEDURE — 76641 ULTRASOUND BREAST COMPLETE: CPT | Mod: 26,50

## 2019-08-12 PROCEDURE — G0279: CPT

## 2020-02-23 NOTE — PHYSICAL EXAM
[Normal] : supple, no neck mass and thyroid not enlarged [Normal Neck Lymph Nodes] : normal neck lymph nodes  [Normal Supraclavicular Lymph Nodes] : normal supraclavicular lymph nodes [Normal Axillary Lymph Nodes] : normal axillary lymph nodes [Normal] : full range of motion and no deformities appreciated [de-identified] : Groins not examined [de-identified] : Below

## 2020-02-23 NOTE — HISTORY OF PRESENT ILLNESS
[de-identified] : 53 year-old lady who February 2017 had wide excision of an uncomplicated 0.55 mm melanoma from the posterior LEFT ARM, with negative margins, and reconstruction by Dr. Soriano.\par \par \par Her dermatologist was Khushboo Colmenares, then Kailee Finney, then one of her associates.\par Biopsies in August 2018 unremarkable.\par May see [facility] - info given\par \par \par December 2016 she was referred by her dermatologist Dr. Khushboo Colmenares with the recent diagnosis of a 0.55 mm uncomplicated melanoma of the posterior aspect of the middle third of her left arm.\par This was an asymptomatic pigmented lesion which she first noticed in the summer of 2016. \par Its gradual growth to the above dermatologic evaluation, biopsy, and diagnosis.\par \par No previous personal or family history of melanoma.\par A brother has had non-melanoma skin cancers.\par \par Her father had myeloma.\par She has a maternal aunt AND a paternal aunt with breast cancer.\par \par Her internist is Dr. Clyde Crisostomo.\par \par She does not have a pacemaker or defibrillator.\par She takes no anticoagulants.\par Her only prescription medication is Dostinex\par \par She has a factor VII deficiency, but no abnormalities with clotting or bleeding.\par Her hematologic evaluation was in [place] where she used to reside until 2012.\par She also has relatives with the factor VII deficiency.\par Her hematologist Dr. Mojica, and Tyrese Costa\par \par August 2019:\par Bilateral mammogram and sonogram at 450: BI-RADS 2\par We'll repeat August 2020............................\par \par She saw her gynecologist Dr. Stevenson in Fall 2018, and had no abnormalities.\par \par \par Her eye examination the summer of 2016 was normal.\par March 2019 visit with Dr. Suh is scheduled\par \par \par She has never had colonoscopy declines

## 2020-02-23 NOTE — ASSESSMENT
[FreeTextEntry1] : Annual chest x-ray will be repeated July 2019, prescriptions are in the system.\par \par Clinically doing well, and 2 years from surgery.\par \par We will see her in another year, sooner if needed\par \par \par August 2019:\par Chest x-ray, no changes.\par We'll repeat August 2020............................

## 2020-08-31 ENCOUNTER — APPOINTMENT (OUTPATIENT)
Dept: SURGICAL ONCOLOGY | Facility: CLINIC | Age: 55
End: 2020-08-31
Payer: COMMERCIAL

## 2020-08-31 VITALS
HEART RATE: 77 BPM | HEIGHT: 65 IN | BODY MASS INDEX: 23.32 KG/M2 | RESPIRATION RATE: 15 BRPM | WEIGHT: 140 LBS | SYSTOLIC BLOOD PRESSURE: 114 MMHG | DIASTOLIC BLOOD PRESSURE: 70 MMHG | OXYGEN SATURATION: 96 %

## 2020-08-31 PROCEDURE — 99215 OFFICE O/P EST HI 40 MIN: CPT

## 2020-08-31 RX ORDER — CEPHALEXIN 500 MG/1
500 TABLET ORAL EVERY 8 HOURS
Qty: 30 | Refills: 0 | Status: ACTIVE | COMMUNITY
Start: 2020-08-31 | End: 1900-01-01

## 2020-08-31 NOTE — HISTORY OF PRESENT ILLNESS
[de-identified] : 55 year-old lady who February 2017 had wide excision of an uncomplicated 0.55 mm melanoma from the posterior LEFT ARM, with negative margins, and reconstruction by Dr. Roni Soriano.\par \par She presents today with a one-week history of pain, redness, tenderness, and swelling in the right groin.\par She saw her internist who started her on amoxicillin.\par Since she saw no improvement after 3 days, she stopped taking the antibiotics.\par While she has not noticed any improvement, there has not been any progression either.\par No drainage.\par No previous similar episodes.\par No constitutional signs or symptoms.\par Not immunocompromised.\par \par \par Her dermatologist was Khushboo Colmenares, then Kailee Finney, then one of her associates.\par Biopsies in August 2018 unremarkable.\par February 2020 she was seen at [facility] dermatology by Tigist DUMONT PA-C, no biopsies performed\par \par \par December 2016 she was referred by her dermatologist at the time, Dr. Khushboo Colmenares, with the recent diagnosis of a 0.55 mm uncomplicated melanoma of the posterior aspect of the middle third of her left arm.\par This was an asymptomatic pigmented lesion which she first noticed in the summer of 2016. \par Its gradual growth led to the above dermatologic evaluation, biopsy, and diagnosis.\par \par No previous personal OR family history of melanoma.\par A brother had non-melanoma skin cancers.\par \par Her father had myeloma.\par A maternal aunt AND a paternal aunt with breast cancer.\par \par \par Her internist is Dr. Clyde GARCIA.\par \par She does not have a pacemaker or defibrillator.\par She takes no anticoagulants.\par \par She has a history of a prolactinoma.\par Previously treated with Dostinex.\par Currently not requiring any treatment.\par Does not see anybody for specific follow-up..\par \par +Factor VII deficiency, but no abnormalities with clotting or bleeding.\par Her hematologic evaluation was in [place] where she lived until 2012.\par +Family hx of factor VII deficiency.\par Hematology: Dr. Asael SALDAÑA\par \par \par August 2019:\par Bilateral mammogram and sonogram at 450: BI-RADS 2\par We'll repeat August 2020, prescription entered\par \par \par She saw her gynecologist Dr. Catalino MCKEON in October 2018, and had no abnormalities.\par She is in the process of scheduling a follow-up visit.\par \par \par Her eye examination the spring 2019 was unremarkable.\par Hemology: Dr. Denis Suh\par \par \par June 2019 colonoscopy by Dr. Serge Eldridge: X5 years

## 2020-08-31 NOTE — REASON FOR VISIT
[Other: _____] : [unfilled] [Follow-Up Visit] : a follow-up visit for [FreeTextEntry2] : Stage I melanoma of the left arm AND evaluation of a soft tissue infection in the right groin

## 2020-08-31 NOTE — REVIEW OF SYSTEMS
[Negative] : Genitourinary [de-identified] : Melanoma [de-identified] : Hyperprolactinemia [FreeTextEntry1] : Factor VII deficiency

## 2020-08-31 NOTE — PHYSICAL EXAM
[Normal] : supple, no neck mass and thyroid not enlarged [Normal Neck Lymph Nodes] : normal neck lymph nodes  [Normal Axillary Lymph Nodes] : normal axillary lymph nodes [Normal Supraclavicular Lymph Nodes] : normal supraclavicular lymph nodes [Normal] : full range of motion and no deformities appreciated [de-identified] : Groins not examined [de-identified] : Below

## 2020-08-31 NOTE — ASSESSMENT
[FreeTextEntry1] : Annual chest x-ray was repeated August 2020.\par Prescription entered for August 2020\par \par Regarding the soft tissue infection of her right groin, presently it appears to be nonsuppurative.\par I have recommended elevation of the leg, with warm compresses.\par I have prescribed her a 10-day course of Keflex (500 mg, 3 times a day).\par I have asked her to return for reexamination in 3 to 4 days, sooner if needed.\par \par I explained that if her infection progresses, or is refractory to oral antibiotics, she may either need intravenous antibiotics, or intravenous and drainage.\par \par If the acute infection resolves with conservative measures, consideration can be given to elective excision of any residual palpable component, to prevent recurrence of the infection.\par \par We will see her in another year, sooner if needed\par

## 2020-09-03 ENCOUNTER — APPOINTMENT (OUTPATIENT)
Dept: SURGICAL ONCOLOGY | Facility: CLINIC | Age: 55
End: 2020-09-03
Payer: COMMERCIAL

## 2020-09-03 VITALS
RESPIRATION RATE: 15 BRPM | OXYGEN SATURATION: 96 % | HEART RATE: 83 BPM | HEIGHT: 65 IN | DIASTOLIC BLOOD PRESSURE: 67 MMHG | BODY MASS INDEX: 23.82 KG/M2 | WEIGHT: 143 LBS | SYSTOLIC BLOOD PRESSURE: 107 MMHG

## 2020-09-03 DIAGNOSIS — C43.62 MALIGNANT MELANOMA OF LEFT UPPER LIMB, INCLUDING SHOULDER: ICD-10-CM

## 2020-09-03 PROCEDURE — 99213 OFFICE O/P EST LOW 20 MIN: CPT

## 2020-09-03 NOTE — HISTORY OF PRESENT ILLNESS
[de-identified] : 55 year-old lady who February 2017 had wide excision of an uncomplicated 0.55 mm melanoma from the posterior LEFT ARM, with negative margins, and reconstruction by Dr. Roni Soriano.\par \par She was seen 8-31-20 with a one-week history of pain, redness, tenderness, and swelling in the right groin.\par She saw her internist who started her on amoxicillin.\par Since she saw no improvement after 3 days, she stopped taking the antibiotics.\par While she has not noticed any improvement, there has not been any progression either.\par No drainage.\par No previous similar episodes.\par No constitutional signs or symptoms.\par Not immunocompromised.\par \par On my exam, she had a nonsuppurative infectious process in the right groin, with tenderness, redness, and induration, but no fluctuance, crepitus, or drainage.\par I had advised elevation of the extremity, warm compresses, and prescribed a 10-day course of Keflex.\par She returns for scheduled follow-up today.\par \par At home, the area drained spontaneously, some white and yellow material.\par No constitutional signs or symptoms.\par The area is less sensitive since it self-decompressed.\par No new complaints.\par \par \par Her dermatologist was Khushboo Colmenares, then Kailee Finney, then one of her associates.\par Biopsies in August 2018 unremarkable.\par February 2020 she was seen at [facility] dermatology by Tigist DUMONT PA-C, no biopsies performed\par \par \par December 2016 she was referred by her dermatologist at the time, Dr. Khushboo Colmenares, with the recent diagnosis of a 0.55 mm uncomplicated melanoma of the posterior aspect of the middle third of her left arm.\par This was an asymptomatic pigmented lesion which she first noticed in the summer of 2016. \par Its gradual growth led to the above dermatologic evaluation, biopsy, and diagnosis.\par \par No previous personal OR family history of melanoma.\par A brother had non-melanoma skin cancers.\par \par Her father had myeloma.\par A maternal aunt AND a paternal aunt with breast cancer.\par \par \par Her internist is Dr. Clyde GARCIA.\par \par She does not have a pacemaker or defibrillator.\par She takes no anticoagulants.\par \par She has a history of a prolactinoma.\par Previously treated with Dostinex.\par Currently not requiring any treatment.\par Does not see anybody for specific follow-up..\par \par +Factor VII deficiency, but no abnormalities with clotting or bleeding.\par Her hematologic evaluation was in [place] where she lived until 2012.\par +Family hx of factor VII deficiency.\par Hematology: Dr. Asael SALDAÑA\par \par \par August 2019:\par Bilateral mammogram and sonogram at 450: BI-RADS 2\par We'll repeat August 2020, prescription entered\par \par \par She saw her gynecologist Dr. Catalino MCKEON in October 2018, and had no abnormalities.\par She is in the process of scheduling a follow-up visit.\par \par \par Her eye examination the spring 2019 was unremarkable.\par Hemology: Dr. Denis Suh\par \par \par June 2019 colonoscopy by Dr. Serge Eldridge: X5 years

## 2020-09-03 NOTE — ASSESSMENT
[FreeTextEntry1] : Annual chest x-ray was repeated August 2019.\par Prescription entered for August 2020\par \par \par I explained that if her infection progresses, or is refractory to oral antibiotics, she may either need intravenous antibiotics, or intravenous and drainage.\par \par If the acute infection resolves with conservative measures, consideration can be given to elective excision of any residual palpable component, to prevent recurrence of the infection.\par \par Today, she is improved.\par She will continue conservative measures and I will see her next week.\par \par She will call me if she needs me sooner.\par

## 2020-09-03 NOTE — REVIEW OF SYSTEMS
[Negative] : Heme/Lymph [de-identified] : History of melanoma [FreeTextEntry7] : Right groin infection [de-identified] : History of prolactinoma

## 2020-09-03 NOTE — REASON FOR VISIT
[Follow-Up Visit] : a follow-up visit for [Other: _____] : [unfilled] [FreeTextEntry2] : Infected epidermal inclusion cyst of the right groin

## 2020-09-09 PROBLEM — L72.0 EPIDERMAL INCLUSION CYST: Status: ACTIVE | Noted: 2020-08-31

## 2020-09-10 ENCOUNTER — APPOINTMENT (OUTPATIENT)
Dept: SURGICAL ONCOLOGY | Facility: CLINIC | Age: 55
End: 2020-09-10
Payer: COMMERCIAL

## 2020-09-10 VITALS
WEIGHT: 140 LBS | HEART RATE: 80 BPM | OXYGEN SATURATION: 95 % | SYSTOLIC BLOOD PRESSURE: 109 MMHG | BODY MASS INDEX: 23.32 KG/M2 | DIASTOLIC BLOOD PRESSURE: 71 MMHG | HEIGHT: 65 IN

## 2020-09-10 DIAGNOSIS — L72.0 EPIDERMAL CYST: ICD-10-CM

## 2020-09-10 PROCEDURE — 99214 OFFICE O/P EST MOD 30 MIN: CPT

## 2020-09-11 NOTE — PHYSICAL EXAM
[Normal] : supple, no neck mass and thyroid not enlarged [Normal Neck Lymph Nodes] : normal neck lymph nodes  [Normal Supraclavicular Lymph Nodes] : normal supraclavicular lymph nodes [Normal Axillary Lymph Nodes] : normal axillary lymph nodes [Normal] : full range of motion and no deformities appreciated [de-identified] : Groins not examined [de-identified] : Below

## 2020-09-11 NOTE — HISTORY OF PRESENT ILLNESS
[de-identified] : 55 year-old lady who February 2017 had wide excision of an uncomplicated 0.55 mm melanoma from the posterior LEFT ARM, with negative margins, and reconstruction by Dr. Roni Soriano.\par \par She was seen 8-31-20 with a one-week history of pain, redness, tenderness, and swelling in the right groin.\par She initially saw her internist who started her on amoxicillin.\par Since she saw no improvement after 3 days, she stopped taking the antibiotics.\par While she had not noticed any improvement, there was no progression either.\par No previous similar episodes.\par No constitutional signs or symptoms.\par Not immunocompromised.\par \par On my exam, she had a nonsuppurative infectious process in the right groin, with tenderness, redness, and induration, but no fluctuance, crepitus, or drainage.\par I had advised elevation of the extremity, warm compresses, and prescribed a 10-day course of Keflex.\par \par September 3, 2020, she returned for scheduled follow-up today.\par She feels much better.\par No pain in the right groin.\par No constitutional signs or symptoms.\par No new complaints.\par \par At home, the area drained spontaneously, some white and yellow material.\par No constitutional signs or symptoms.\par The area is less sensitive since it self-decompressed.\par No new complaints.\par \par She returns for planned follow-up today.\par \par \par Her dermatologist was Khushboo Colmenares, then Kailee Finney, then one of her associates.\par Biopsies in August 2018 unremarkable.\par February 2020 she was seen at [facility] dermatology by Tigist DUMONT PA-C, no biopsies performed\par \par \par December 2016 she was referred by her dermatologist at the time, Dr. Khushboo Colmenares, with the recent diagnosis of a 0.55 mm uncomplicated melanoma of the posterior aspect of the middle third of her left arm.\par This was an asymptomatic pigmented lesion which she first noticed in the summer of 2016. \par Its gradual growth led to the above dermatologic evaluation, biopsy, and diagnosis.\par \par No previous personal OR family history of melanoma.\par A brother had non-melanoma skin cancers.\par \par Her father had myeloma.\par A maternal aunt AND a paternal aunt with breast cancer.\par \par \par Her internist is Dr. 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 Asael SALDAÑA\par \par \par August 2019:\par Bilateral mammogram and sonogram at 450: BI-RADS 2\par We'll repeat August 2020, prescription entered.....................................\par \par \par She saw her gynecologist Dr. Catalino MCKEON in October 2018, and had no abnormalities.\par She is in the process of scheduling a follow-up visit............................\par \par \par Her eye examination the spring 2019 was unremarkable.\par Ophthalmology: Dr. Denis Suh\par \par \par June 2019 colonoscopy by Dr. Serge Eldridge: X5 years

## 2020-09-11 NOTE — ASSESSMENT
[FreeTextEntry1] : Annual chest x-ray was repeated August 2019.\par Prescription entered for August 2020\par \par \par I explained that if her infection progresses, or is refractory to oral antibiotics, she may either need intravenous antibiotics, or intravenous and drainage.\par \par If the acute infection resolves with conservative measures, consideration can be given to elective excision of any residual palpable component, to prevent recurrence of the infection.\par \par Today, she is improved.\par She has completed the course of oral antibiotics.\par \par I offered elective excision, versus expectant management.\par The latter is her preference.\par She will return for her melanoma follow-up in February 2021, sooner if needed.\par

## 2020-09-11 NOTE — REVIEW OF SYSTEMS
[Negative] : Endocrine [FreeTextEntry1] : Factor VII deficiency [de-identified] : Melanoma [de-identified] : Prolactinoma

## 2020-09-11 NOTE — REASON FOR VISIT
[Follow-Up Visit] : a follow-up visit for [Other: _____] : [unfilled] [FreeTextEntry2] : Soft tissue infection of the right groin

## 2020-09-23 ENCOUNTER — APPOINTMENT (OUTPATIENT)
Dept: RADIOLOGY | Facility: IMAGING CENTER | Age: 55
End: 2020-09-23
Payer: COMMERCIAL

## 2020-09-23 ENCOUNTER — RESULT REVIEW (OUTPATIENT)
Age: 55
End: 2020-09-23

## 2020-09-23 ENCOUNTER — OUTPATIENT (OUTPATIENT)
Dept: OUTPATIENT SERVICES | Facility: HOSPITAL | Age: 55
LOS: 1 days | End: 2020-09-23
Payer: COMMERCIAL

## 2020-09-23 ENCOUNTER — APPOINTMENT (OUTPATIENT)
Dept: ULTRASOUND IMAGING | Facility: IMAGING CENTER | Age: 55
End: 2020-09-23
Payer: COMMERCIAL

## 2020-09-23 ENCOUNTER — APPOINTMENT (OUTPATIENT)
Dept: MAMMOGRAPHY | Facility: IMAGING CENTER | Age: 55
End: 2020-09-23
Payer: COMMERCIAL

## 2020-09-23 DIAGNOSIS — C43.62 MALIGNANT MELANOMA OF LEFT UPPER LIMB, INCLUDING SHOULDER: ICD-10-CM

## 2020-09-23 DIAGNOSIS — Z00.8 ENCOUNTER FOR OTHER GENERAL EXAMINATION: ICD-10-CM

## 2020-09-23 DIAGNOSIS — Z90.49 ACQUIRED ABSENCE OF OTHER SPECIFIED PARTS OF DIGESTIVE TRACT: Chronic | ICD-10-CM

## 2020-09-23 PROCEDURE — 76641 ULTRASOUND BREAST COMPLETE: CPT | Mod: 26,50

## 2020-09-23 PROCEDURE — 76641 ULTRASOUND BREAST COMPLETE: CPT

## 2020-09-23 PROCEDURE — 77067 SCR MAMMO BI INCL CAD: CPT

## 2020-09-23 PROCEDURE — 71046 X-RAY EXAM CHEST 2 VIEWS: CPT | Mod: 26

## 2020-09-23 PROCEDURE — 77067 SCR MAMMO BI INCL CAD: CPT | Mod: 26

## 2020-09-23 PROCEDURE — 77063 BREAST TOMOSYNTHESIS BI: CPT | Mod: 26

## 2020-09-23 PROCEDURE — 77063 BREAST TOMOSYNTHESIS BI: CPT

## 2020-09-23 PROCEDURE — 71046 X-RAY EXAM CHEST 2 VIEWS: CPT

## 2021-01-22 ENCOUNTER — APPOINTMENT (OUTPATIENT)
Dept: OPHTHALMOLOGY | Facility: CLINIC | Age: 56
End: 2021-01-22
Payer: COMMERCIAL

## 2021-01-22 ENCOUNTER — NON-APPOINTMENT (OUTPATIENT)
Age: 56
End: 2021-01-22

## 2021-01-22 PROCEDURE — 99072 ADDL SUPL MATRL&STAF TM PHE: CPT

## 2021-01-22 PROCEDURE — 92014 COMPRE OPH EXAM EST PT 1/>: CPT

## 2021-02-11 ENCOUNTER — APPOINTMENT (OUTPATIENT)
Dept: SURGICAL ONCOLOGY | Facility: CLINIC | Age: 56
End: 2021-02-11

## 2021-09-29 ENCOUNTER — APPOINTMENT (OUTPATIENT)
Dept: ULTRASOUND IMAGING | Facility: IMAGING CENTER | Age: 56
End: 2021-09-29
Payer: COMMERCIAL

## 2021-09-29 ENCOUNTER — OUTPATIENT (OUTPATIENT)
Dept: OUTPATIENT SERVICES | Facility: HOSPITAL | Age: 56
LOS: 1 days | End: 2021-09-29
Payer: COMMERCIAL

## 2021-09-29 ENCOUNTER — APPOINTMENT (OUTPATIENT)
Dept: MAMMOGRAPHY | Facility: IMAGING CENTER | Age: 56
End: 2021-09-29
Payer: COMMERCIAL

## 2021-09-29 DIAGNOSIS — Z00.8 ENCOUNTER FOR OTHER GENERAL EXAMINATION: ICD-10-CM

## 2021-09-29 DIAGNOSIS — Z90.49 ACQUIRED ABSENCE OF OTHER SPECIFIED PARTS OF DIGESTIVE TRACT: Chronic | ICD-10-CM

## 2021-09-29 PROCEDURE — 77063 BREAST TOMOSYNTHESIS BI: CPT | Mod: 26

## 2021-09-29 PROCEDURE — 77067 SCR MAMMO BI INCL CAD: CPT | Mod: 26

## 2021-09-29 PROCEDURE — 76641 ULTRASOUND BREAST COMPLETE: CPT | Mod: 26,50

## 2021-09-29 PROCEDURE — 77063 BREAST TOMOSYNTHESIS BI: CPT

## 2021-09-29 PROCEDURE — 76641 ULTRASOUND BREAST COMPLETE: CPT

## 2021-09-29 PROCEDURE — 77067 SCR MAMMO BI INCL CAD: CPT

## 2022-01-07 NOTE — ED ADULT NURSE NOTE - ED CARDIAC HEART SOUNDS
Pt notified of COVID PCR results after verification of 2 patient identifiers.    normal S1, S2 heard

## 2022-11-23 ENCOUNTER — RESULT REVIEW (OUTPATIENT)
Age: 57
End: 2022-11-23

## 2022-11-23 ENCOUNTER — OUTPATIENT (OUTPATIENT)
Dept: OUTPATIENT SERVICES | Facility: HOSPITAL | Age: 57
LOS: 1 days | End: 2022-11-23
Payer: COMMERCIAL

## 2022-11-23 ENCOUNTER — APPOINTMENT (OUTPATIENT)
Dept: ULTRASOUND IMAGING | Facility: IMAGING CENTER | Age: 57
End: 2022-11-23
Payer: COMMERCIAL

## 2022-11-23 ENCOUNTER — APPOINTMENT (OUTPATIENT)
Dept: MAMMOGRAPHY | Facility: IMAGING CENTER | Age: 57
End: 2022-11-23
Payer: COMMERCIAL

## 2022-11-23 DIAGNOSIS — Z00.8 ENCOUNTER FOR OTHER GENERAL EXAMINATION: ICD-10-CM

## 2022-11-23 DIAGNOSIS — Z90.49 ACQUIRED ABSENCE OF OTHER SPECIFIED PARTS OF DIGESTIVE TRACT: Chronic | ICD-10-CM

## 2022-11-23 PROCEDURE — 76641 ULTRASOUND BREAST COMPLETE: CPT | Mod: 26,50

## 2022-11-23 PROCEDURE — 77067 SCR MAMMO BI INCL CAD: CPT | Mod: 26

## 2022-11-23 PROCEDURE — 77063 BREAST TOMOSYNTHESIS BI: CPT | Mod: 26

## 2022-11-23 PROCEDURE — 76641 ULTRASOUND BREAST COMPLETE: CPT

## 2022-11-23 PROCEDURE — 77067 SCR MAMMO BI INCL CAD: CPT

## 2022-11-23 PROCEDURE — 77063 BREAST TOMOSYNTHESIS BI: CPT

## 2023-02-07 ENCOUNTER — NON-APPOINTMENT (OUTPATIENT)
Age: 58
End: 2023-02-07

## 2023-02-16 ENCOUNTER — APPOINTMENT (OUTPATIENT)
Dept: OBGYN | Facility: CLINIC | Age: 58
End: 2023-02-16
Payer: COMMERCIAL

## 2023-02-16 VITALS
WEIGHT: 150 LBS | SYSTOLIC BLOOD PRESSURE: 125 MMHG | BODY MASS INDEX: 24.99 KG/M2 | DIASTOLIC BLOOD PRESSURE: 83 MMHG | HEIGHT: 65 IN

## 2023-02-16 DIAGNOSIS — N63.10 UNSPECIFIED LUMP IN THE RIGHT BREAST, UNSPECIFIED QUADRANT: ICD-10-CM

## 2023-02-16 PROCEDURE — 99386 PREV VISIT NEW AGE 40-64: CPT

## 2023-02-17 ENCOUNTER — NON-APPOINTMENT (OUTPATIENT)
Age: 58
End: 2023-02-17

## 2023-02-17 ENCOUNTER — APPOINTMENT (OUTPATIENT)
Dept: OPHTHALMOLOGY | Facility: CLINIC | Age: 58
End: 2023-02-17
Payer: COMMERCIAL

## 2023-02-17 PROCEDURE — 92014 COMPRE OPH EXAM EST PT 1/>: CPT

## 2023-02-19 LAB — HPV HIGH+LOW RISK DNA PNL CVX: NOT DETECTED

## 2023-02-22 LAB — CYTOLOGY CVX/VAG DOC THIN PREP: ABNORMAL

## 2023-05-26 ENCOUNTER — RESULT REVIEW (OUTPATIENT)
Age: 58
End: 2023-05-26

## 2023-05-26 ENCOUNTER — APPOINTMENT (OUTPATIENT)
Dept: ULTRASOUND IMAGING | Facility: IMAGING CENTER | Age: 58
End: 2023-05-26
Payer: COMMERCIAL

## 2023-05-26 ENCOUNTER — OUTPATIENT (OUTPATIENT)
Dept: OUTPATIENT SERVICES | Facility: HOSPITAL | Age: 58
LOS: 1 days | End: 2023-05-26
Payer: COMMERCIAL

## 2023-05-26 DIAGNOSIS — N63.10 UNSPECIFIED LUMP IN THE RIGHT BREAST, UNSPECIFIED QUADRANT: ICD-10-CM

## 2023-05-26 DIAGNOSIS — Z00.8 ENCOUNTER FOR OTHER GENERAL EXAMINATION: ICD-10-CM

## 2023-05-26 DIAGNOSIS — Z90.49 ACQUIRED ABSENCE OF OTHER SPECIFIED PARTS OF DIGESTIVE TRACT: Chronic | ICD-10-CM

## 2023-05-26 PROCEDURE — 76642 ULTRASOUND BREAST LIMITED: CPT

## 2023-05-26 PROCEDURE — 76642 ULTRASOUND BREAST LIMITED: CPT | Mod: 26,RT

## 2023-09-29 ENCOUNTER — RESULT REVIEW (OUTPATIENT)
Age: 58
End: 2023-09-29

## 2023-11-15 ENCOUNTER — LABORATORY RESULT (OUTPATIENT)
Age: 58
End: 2023-11-15

## 2023-11-24 ENCOUNTER — RESULT REVIEW (OUTPATIENT)
Age: 58
End: 2023-11-24

## 2023-11-24 ENCOUNTER — OUTPATIENT (OUTPATIENT)
Dept: OUTPATIENT SERVICES | Facility: HOSPITAL | Age: 58
LOS: 1 days | End: 2023-11-24
Payer: COMMERCIAL

## 2023-11-24 ENCOUNTER — APPOINTMENT (OUTPATIENT)
Dept: MAMMOGRAPHY | Facility: IMAGING CENTER | Age: 58
End: 2023-11-24
Payer: COMMERCIAL

## 2023-11-24 ENCOUNTER — APPOINTMENT (OUTPATIENT)
Dept: ULTRASOUND IMAGING | Facility: IMAGING CENTER | Age: 58
End: 2023-11-24
Payer: COMMERCIAL

## 2023-11-24 DIAGNOSIS — Z90.49 ACQUIRED ABSENCE OF OTHER SPECIFIED PARTS OF DIGESTIVE TRACT: Chronic | ICD-10-CM

## 2023-11-24 DIAGNOSIS — N63.10 UNSPECIFIED LUMP IN THE RIGHT BREAST, UNSPECIFIED QUADRANT: ICD-10-CM

## 2023-11-24 DIAGNOSIS — Z00.00 ENCOUNTER FOR GENERAL ADULT MEDICAL EXAMINATION WITHOUT ABNORMAL FINDINGS: ICD-10-CM

## 2023-11-24 PROCEDURE — G0279: CPT | Mod: 26

## 2023-11-24 PROCEDURE — 77066 DX MAMMO INCL CAD BI: CPT

## 2023-11-24 PROCEDURE — 76641 ULTRASOUND BREAST COMPLETE: CPT | Mod: 26,50

## 2023-11-24 PROCEDURE — 77066 DX MAMMO INCL CAD BI: CPT | Mod: 26

## 2023-11-24 PROCEDURE — G0279: CPT

## 2023-11-24 PROCEDURE — 76641 ULTRASOUND BREAST COMPLETE: CPT

## 2024-03-01 ENCOUNTER — APPOINTMENT (OUTPATIENT)
Dept: OBGYN | Facility: CLINIC | Age: 59
End: 2024-03-01
Payer: COMMERCIAL

## 2024-03-01 VITALS
DIASTOLIC BLOOD PRESSURE: 64 MMHG | SYSTOLIC BLOOD PRESSURE: 130 MMHG | BODY MASS INDEX: 25.33 KG/M2 | HEIGHT: 65 IN | WEIGHT: 152 LBS

## 2024-03-01 DIAGNOSIS — Z01.419 ENCOUNTER FOR GYNECOLOGICAL EXAMINATION (GENERAL) (ROUTINE) W/OUT ABNORMAL FINDINGS: ICD-10-CM

## 2024-03-01 DIAGNOSIS — R92.8 OTHER ABNORMAL AND INCONCLUSIVE FINDINGS ON DIAGNOSTIC IMAGING OF BREAST: ICD-10-CM

## 2024-03-01 PROCEDURE — G0444 DEPRESSION SCREEN ANNUAL: CPT

## 2024-03-01 PROCEDURE — 99396 PREV VISIT EST AGE 40-64: CPT | Mod: 25

## 2024-03-01 NOTE — PHYSICAL EXAM
[Chaperone Declined] : Patient declined chaperone [Alert] : alert [Appropriately responsive] : appropriately responsive [No Acute Distress] : no acute distress [Soft] : soft [Non-tender] : non-tender [Non-distended] : non-distended [No HSM] : No HSM [No Lesions] : no lesions [No Mass] : no mass [Oriented x3] : oriented x3 [Examination Of The Breasts] : a normal appearance [No Masses] : no breast masses were palpable [Labia Majora] : normal [Labia Minora] : normal [Normal] : normal [Uterine Adnexae] : normal

## 2024-03-01 NOTE — HISTORY OF PRESENT ILLNESS
[FreeTextEntry1] :  early 50s  57yo P2 here for wellness,, last seen . No complaints.   HCM - pap 2023 nlim/hpv neg - mammo/sono 2023 birads 3 f/u 6 mo for L breast - colonoscopy UTD FT  x 2, 5 grandchilren

## 2024-03-01 NOTE — PLAN
[FreeTextEntry1] : Pap UTD, mammo/sono given for L breast f/u. Pt noted some discomfort after spec exam, + atrophy, discussed vaginal moisturizers.  Patient screened for depression - no signs of clinical depression. PHQ-9 scores reviewed over the course of the visit 5-10minutes of face to face time. Follow up with changes in mood including other symptoms of anxiety.

## 2024-07-18 ENCOUNTER — OUTPATIENT (OUTPATIENT)
Dept: OUTPATIENT SERVICES | Facility: HOSPITAL | Age: 59
LOS: 1 days | End: 2024-07-18
Payer: COMMERCIAL

## 2024-07-18 ENCOUNTER — APPOINTMENT (OUTPATIENT)
Dept: MAMMOGRAPHY | Facility: IMAGING CENTER | Age: 59
End: 2024-07-18
Payer: COMMERCIAL

## 2024-07-18 ENCOUNTER — RESULT REVIEW (OUTPATIENT)
Age: 59
End: 2024-07-18

## 2024-07-18 ENCOUNTER — APPOINTMENT (OUTPATIENT)
Dept: ULTRASOUND IMAGING | Facility: IMAGING CENTER | Age: 59
End: 2024-07-18
Payer: COMMERCIAL

## 2024-07-18 DIAGNOSIS — Z90.49 ACQUIRED ABSENCE OF OTHER SPECIFIED PARTS OF DIGESTIVE TRACT: Chronic | ICD-10-CM

## 2024-07-18 DIAGNOSIS — Z00.8 ENCOUNTER FOR OTHER GENERAL EXAMINATION: ICD-10-CM

## 2024-07-18 PROCEDURE — 77065 DX MAMMO INCL CAD UNI: CPT

## 2024-07-18 PROCEDURE — 76642 ULTRASOUND BREAST LIMITED: CPT | Mod: 26,LT

## 2024-07-18 PROCEDURE — G0279: CPT

## 2024-07-18 PROCEDURE — 77065 DX MAMMO INCL CAD UNI: CPT | Mod: 26,LT

## 2024-07-18 PROCEDURE — G0279: CPT | Mod: 26

## 2024-07-18 PROCEDURE — 76642 ULTRASOUND BREAST LIMITED: CPT

## 2024-08-13 NOTE — H&P PST ADULT - HISTORY OF PRESENT ILLNESS
Feels well. Denies LOF/CTX/VB. Discussed fetal awareness. No concerns.  Yellow folder given today.  Patient has hemorrhoids which have started to bother her since starting her iron therapy.  She will work on staying soft and increasing her fluids since her urine was very concentrated today.  Overview charting updated today.     A 52 y/o old female with PMH of factor VII deficiency with no h/o of bleeding problem (pt stated that she doesn't know she has problem ), recent diagnosis of malignant melanoma left upper arm .Pt coming in PST for Wide excision of melanoma left arm on 2/16/2017.  pt was scheduled for the surgery in ambulatory  1/2017 and PST was done and re-scheduled the surgery in  Main OR secondary to factor 7 deficiency. A 50 y/o old female with PMH of factor VII deficiency with no h/o of bleeding problem (pt stated that she doesn't know she has problem ), recent diagnosis of malignant melanoma left upper arm .Pt coming in PST for Wide excision of melanoma left arm on 2/16/2017.  Pt was scheduled for the surgery in ambulatory  1/2017 and PST was done showed an elevated INR due to factor 7 defecieny and re-scheduled the surgery.

## 2024-08-30 ENCOUNTER — NON-APPOINTMENT (OUTPATIENT)
Age: 59
End: 2024-08-30

## 2024-08-30 ENCOUNTER — APPOINTMENT (OUTPATIENT)
Dept: OPHTHALMOLOGY | Facility: CLINIC | Age: 59
End: 2024-08-30
Payer: COMMERCIAL

## 2024-08-30 PROCEDURE — 92004 COMPRE OPH EXAM NEW PT 1/>: CPT

## 2024-08-30 PROCEDURE — 92133 CPTRZD OPH DX IMG PST SGM ON: CPT

## 2024-08-30 PROCEDURE — 92025 CPTRIZED CORNEAL TOPOGRAPHY: CPT

## 2025-02-04 ENCOUNTER — APPOINTMENT (OUTPATIENT)
Dept: MAMMOGRAPHY | Facility: IMAGING CENTER | Age: 60
End: 2025-02-04

## 2025-02-04 ENCOUNTER — APPOINTMENT (OUTPATIENT)
Dept: ULTRASOUND IMAGING | Facility: IMAGING CENTER | Age: 60
End: 2025-02-04

## 2025-02-11 ENCOUNTER — RESULT REVIEW (OUTPATIENT)
Age: 60
End: 2025-02-11

## 2025-02-11 ENCOUNTER — APPOINTMENT (OUTPATIENT)
Dept: ULTRASOUND IMAGING | Facility: IMAGING CENTER | Age: 60
End: 2025-02-11
Payer: COMMERCIAL

## 2025-02-11 ENCOUNTER — OUTPATIENT (OUTPATIENT)
Dept: OUTPATIENT SERVICES | Facility: HOSPITAL | Age: 60
LOS: 1 days | End: 2025-02-11
Payer: COMMERCIAL

## 2025-02-11 ENCOUNTER — APPOINTMENT (OUTPATIENT)
Dept: MAMMOGRAPHY | Facility: IMAGING CENTER | Age: 60
End: 2025-02-11
Payer: COMMERCIAL

## 2025-02-11 DIAGNOSIS — Z90.49 ACQUIRED ABSENCE OF OTHER SPECIFIED PARTS OF DIGESTIVE TRACT: Chronic | ICD-10-CM

## 2025-02-11 DIAGNOSIS — Z00.00 ENCOUNTER FOR GENERAL ADULT MEDICAL EXAMINATION WITHOUT ABNORMAL FINDINGS: ICD-10-CM

## 2025-02-11 DIAGNOSIS — Z80.3 FAMILY HISTORY OF MALIGNANT NEOPLASM OF BREAST: ICD-10-CM

## 2025-02-11 PROCEDURE — G0279: CPT | Mod: 26

## 2025-02-11 PROCEDURE — 76641 ULTRASOUND BREAST COMPLETE: CPT | Mod: 26,50

## 2025-02-11 PROCEDURE — G0279: CPT

## 2025-02-11 PROCEDURE — 77066 DX MAMMO INCL CAD BI: CPT | Mod: 26

## 2025-02-11 PROCEDURE — 77066 DX MAMMO INCL CAD BI: CPT

## 2025-02-11 PROCEDURE — 76641 ULTRASOUND BREAST COMPLETE: CPT

## 2025-03-13 ENCOUNTER — APPOINTMENT (OUTPATIENT)
Dept: OPHTHALMOLOGY | Facility: CLINIC | Age: 60
End: 2025-03-13

## 2025-03-28 ENCOUNTER — NON-APPOINTMENT (OUTPATIENT)
Age: 60
End: 2025-03-28

## 2025-04-01 ENCOUNTER — APPOINTMENT (OUTPATIENT)
Dept: OBGYN | Facility: CLINIC | Age: 60
End: 2025-04-01
Payer: COMMERCIAL

## 2025-04-01 VITALS
BODY MASS INDEX: 22.99 KG/M2 | HEIGHT: 65 IN | SYSTOLIC BLOOD PRESSURE: 124 MMHG | DIASTOLIC BLOOD PRESSURE: 73 MMHG | WEIGHT: 138 LBS

## 2025-04-01 DIAGNOSIS — Z01.419 ENCOUNTER FOR GYNECOLOGICAL EXAMINATION (GENERAL) (ROUTINE) W/OUT ABNORMAL FINDINGS: ICD-10-CM

## 2025-04-01 DIAGNOSIS — R35.0 FREQUENCY OF MICTURITION: ICD-10-CM

## 2025-04-01 PROCEDURE — 99396 PREV VISIT EST AGE 40-64: CPT

## 2025-04-01 PROCEDURE — 99214 OFFICE O/P EST MOD 30 MIN: CPT | Mod: 25

## 2025-04-01 RX ORDER — ESTRADIOL 0.1 MG/G
0.1 CREAM VAGINAL
Qty: 1 | Refills: 1 | Status: ACTIVE | COMMUNITY
Start: 2025-04-01 | End: 1900-01-01

## 2025-04-03 LAB — BACTERIA UR CULT: NORMAL
